# Patient Record
Sex: FEMALE | Race: WHITE | URBAN - METROPOLITAN AREA
[De-identification: names, ages, dates, MRNs, and addresses within clinical notes are randomized per-mention and may not be internally consistent; named-entity substitution may affect disease eponyms.]

---

## 2017-05-16 ENCOUNTER — PRE VISIT (OUTPATIENT)
Dept: CARDIOLOGY | Facility: CLINIC | Age: 32
End: 2017-05-16

## 2017-05-17 ENCOUNTER — OFFICE VISIT (OUTPATIENT)
Dept: CARDIOLOGY | Facility: CLINIC | Age: 32
End: 2017-05-17
Payer: COMMERCIAL

## 2017-05-17 VITALS — OXYGEN SATURATION: 98 % | SYSTOLIC BLOOD PRESSURE: 105 MMHG | DIASTOLIC BLOOD PRESSURE: 74 MMHG | HEART RATE: 76 BPM

## 2017-05-17 DIAGNOSIS — R00.2 PALPITATIONS: Primary | ICD-10-CM

## 2017-05-17 DIAGNOSIS — R06.09 DOE (DYSPNEA ON EXERTION): ICD-10-CM

## 2017-05-17 PROCEDURE — 93000 ELECTROCARDIOGRAM COMPLETE: CPT | Performed by: INTERNAL MEDICINE

## 2017-05-17 PROCEDURE — 99203 OFFICE O/P NEW LOW 30 MIN: CPT | Mod: 25 | Performed by: INTERNAL MEDICINE

## 2017-05-17 ASSESSMENT — PAIN SCALES - GENERAL: PAINLEVEL: MILD PAIN (2)

## 2017-05-17 NOTE — LETTER
5/17/2017      RE: Adela Sanchez  [address] [facility] [address]       Dear Colleague,    Thank you for the opportunity to participate in the care of your patient, Adela Sanchez, at the [facility] HEART AT [facility] Please see a copy of my visit note below.       SUBJECTIVE:  Adela Sanchez is a 32 year old female who presents for evaluation of palpitation and SOB.    Had second baby a week ago. During late pregnancy and post partum noticed palpitation and SOB. Get up in night with fast heartbeat,.Associated with SOB. No LOC   No excess coffee or alcohol.  Last pregnancy did not have this much problem.  Never had palpitation before.  No other co-morbidities.    Patient Active Problem List    Diagnosis Date Noted     GERD (gastroesophageal reflux disease) 08/09/2011     Priority: Medium     CARDIOVASCULAR SCREENING; LDL GOAL LESS THAN 160 10/31/2010     Priority: Medium     Adjustment disorder with anxiety 01/07/2008     Priority: Medium    .  Current Outpatient Prescriptions   Medication Sig     Sertraline HCl (ZOLOFT PO) Take 25 mg by mouth     albuterol 90 MCG/ACT inhaler Inhale 2 puffs into the lungs every 6 hours as needed for shortness of breath / dyspnea.     fluticasone (FLONASE) 50 MCG/ACT nasal spray 1-2 sprays by Both Nostrils route daily.     FISH OIL PO 1 TABLET DAILY     MULTIVITAMIN OR None Entered     No current facility-administered medications for this visit.      Past Medical History:   Diagnosis Date     Anxiety disorder      Asthma, intermittent      Past Surgical History:   Procedure Laterality Date     C RAD RESEC TONSIL/PILLARS       Allergies   Allergen Reactions     Penicillins Swelling and Difficulty breathing     Social History     Social History     Marital status:      Spouse name: N/A     Number of children: N/A     Years of education: N/A     Occupational History      Not on file.     Social History Main Topics     Smoking status: Never Smoker     Smokeless tobacco: Never Used     Alcohol use Yes      Comment: wine occ.     Drug use: No     Sexual activity: Yes     Partners: Male     Birth control/ protection: Pill      Comment: Adriane     Other Topics Concern     Parent/Sibling W/ Cabg, Mi Or Angioplasty Before 65f 55m? No     Social History Narrative    Dairy/d 0-1 servings/d.     Caffeine 0 servings/d    Exercise 6 x week    Sunscreen used - Yes    Seatbelts used - Yes    Working smoke/CO detectors in the home - Yes    Guns stored in the home - No    Self Breast Exams - Yes    Self Testicular Exam - NOT APPLICABLE    Eye Exam up to date - No    Dental Exam up to date - No    Pap Smear up to date - Yes 4 months ago    Mammogram up to date - NOT APPLICABLE    PSA up to date - NOT APPLICABLE    Dexa Scan up to date - NOT APPLICABLE    Flex Sig / Colonoscopy up to date - NOT APPLICABLE    Immunizations up to date - Yes    Abuse: Current or Past(Physical, Sexual or Emotional)- No    Do you feel safe in your environment - Yes    1/08     Family History   Problem Relation Age of Onset     Prostate Cancer Father      Hypertension Paternal Grandfather      CANCER Paternal Grandmother      skin CA          REVIEW OF SYSTEMS:  General: negative, fever, chills, night sweats  Skin: negative, acne, rash and scaling  Eyes: negative, double vision, eye pain and photophobia  Ears/Nose/Throat: negative, nasal congestion and purulent rhinorrhea  Respiratory: No cough, No hemoptysis and negative  Cardiovascular: negative, chest pain, exertional chest pain or pressure, paroxysmal nocturnal dyspnea and orthopnea  Gastrointestinal: negative, dysphagia, nausea and vomiting  Genitourinary: negative, nocturia, dysuria and frequency  Musculoskeletal: negative, fracture, back pain, neck pain, arthritis and joint pain  Neurologic: negative, headaches, syncope, stroke, seizures and paralysis  Psychiatric:  negative, nervous breakdown, thoughts of self-harm and thoughts of hurting someone else  Hematologic/Lymphatic/Immunologic: negative, bleeding disorder, chills and fever  Endocrine: negative, cold intolerance, heat intolerance and hot flashes       OBJECTIVE:  Blood pressure 105/74, pulse 76, SpO2 98 %.  General Appearance: healthy, alert, active and no distress  Head: Normocephalic. No masses, lesions, tenderness or abnormalities  Eyes: conjuctiva clear, PERRL, EOM intact  Ears: External ears normal. Canals clear. TM's normal.  Nose: Nares normal  Mouth: normal  Neck: Supple, no cervical adenopathy, no thyromegaly  Lungs: clear to auscultation  Cardiac: regular rate and rhythm, normal S1 and S2, no murmur  Abdomen: Soft, nontender.  Normal bowel sounds.  No hepatosplenomegaly or abnormal masses  Extremities: no peripheral edema, peripheral pulses normal  Musculoskeletal: negative  Neurological: Cranial nerves 2-12 intact, motor strength intact       ASSESSMENT/PLAN:  32 yr old female. A week or so post delivery of second babby. Presents with palpitation and SOB,FLOWERS.  Never had similar symptoms in the past.  Patient do have anxiety,but this is different as per patient.  EKG done reviewed. SR.Low voltage.  Concern of postpartum cardiomyopathy.  Will check a week of Zio and an echo.  Will decide management once result available.  Meanwhile advised to avoid excess coffee/alcohol.  Per orders.   Return to Clinic PRN.    Please do not hesitate to contact me if you have any questions/concerns.     Sincerely,     CASTRO Noonan MD

## 2017-05-17 NOTE — MR AVS SNAPSHOT
After Visit Summary   5/17/2017    Adela Sanchez    MRN: [id number]           Patient Information     Date Of Birth          1985        Visit Information        Provider Department      5/17/2017 1:30 PM CASTRO Noonan MD [facility] PHYSICIANS HEART AT [facility]        Today's Diagnoses     Palpitations    -  1      Care Instructions    1.  Dr. SUDHIR Muller has ordered an echocardiogram to be performed.  We have scheduled your echocardiogram appointment at the  [facility] Imaging Department ([address] [place] [address]) for Monday, May 22, 2017 at 10:00am.  Please arrive 15 minutes early to allow time for registration.  The Cardiology Nurse will contact you regarding the results (please see result notification details at bottom of summary).    Echocardiogram Instructions:  -Wear comfortable clothing  -Refrain from wearing perfumes or scented lotions    2. Immediately following your echo, we will place a Zio Patch monitor on you to wear for 7 days.    3. Your follow up will be determined on your results.      [facility] Cardiology - [place] Location    If you have any questions regarding to your visit please contact your care team:     Cardiology  Telephone Number   Tamera Mcpherson  Cardiology RN's.    Lani Banegas CMA [phone]    After hours: [phone].  [phone]   For scheduling appts:     [phone] or  [phone]    After hours: [phone]   For the Device Clinic (Pacemakers and ICD's)  RN's :  Sanjuana Rome   During business hours: [phone]  After business hours:  [phone]- select option 4.      If you need a medication refill please contact your pharmacy.  Please allow 3 business days for your refill to be completed.    As always, Thank you for trusting us with your health care needs!  _____________________________________________________________________              Follow-ups after your visit         Your next 10 appointments already scheduled     May 22, 2017 10:00 AM CDT   Ech Complete with [id number]   [facility] [place] ([facility])    [address] [place] [id number] [phone]           1.  Please bring or wear a comfortable two-piece outfit. 2.  You may eat, drink and take your normal medicines. 3.  For any questions that cannot be answered, please contact the ordering physician            May 22, 2017 11:00 AM CDT   Nurse Only with FK ANCILLARY CARDS   [facility] HEART AT [facility] ([facility])    [address] [place] [id number]   [phone]              Future tests that were ordered for you today     Open Future Orders        Priority Expected Expires Ordered    Echocardiogram Complete Routine  5/17/2018 5/17/2017    Ziopatch Holter Monitor - Adult Routine  7/16/2017 5/17/2017            Who to contact     If you have questions or need follow up information about today's clinic visit or your schedule please contact [facility] directly at [phone].  Normal or non-critical lab and imaging results will be communicated to you by Automated Insightshart, letter or phone within 4 business days after the clinic has received the results. If you do not hear from us within 7 days, please contact the clinic through Mobilitrixt or phone. If you have a critical or abnormal lab result, we will notify you by phone as soon as possible.  Submit refill requests through TRAFFIQ or call your pharmacy and they will forward the refill request to us. Please allow 3 business days for your refill to be completed.          Additional Information About Your Visit        Automated Insightshart Information     TRAFFIQ gives you secure access to your electronic health record. If you see a primary care provider, you can also send messages to your care team and make appointments. If you have  questions, please call your primary care clinic.  If you do not have a primary care provider, please call [phone] and they will assist you.        Care EveryWhere ID     This is your Care EveryWhere ID. This could be used by other organizations to access your [facility] medical records  [id number]        Your Vitals Were     Pulse Pulse Oximetry                76 98%           Blood Pressure from Last 3 Encounters:   05/17/17 105/74   02/02/12 105/62   01/11/12 113/76    Weight from Last 3 Encounters:   02/02/12 62.1 kg (137 lb)   01/11/12 61.7 kg (136 lb)   10/21/11 61.3 kg (135 lb 4 oz)              We Performed the Following     EKG 12-lead complete w/read - Clinics        Primary Care Provider Office Phone #    Bob Rhoades [facility] [phone]       No address on file        Thank you!     Thank you for choosing [facility] PHYSICIANS HEART AT [facility]  for your care. Our goal is always to provide you with excellent care. Hearing back from our patients is one way we can continue to improve our services. Please take a few minutes to complete the written survey that you may receive in the mail after your visit with us. Thank you!             Your Updated Medication List - Protect others around you: Learn how to safely use, store and throw away your medicines at www.disposemymeds.org.          This list is accurate as of: 5/17/17  2:17 PM.  Always use your most recent med list.                   Brand Name Dispense Instructions for use    albuterol 90 MCG/ACT inhaler     1 Inhaler    Inhale 2 puffs into the lungs every 6 hours as needed for shortness of breath / dyspnea.       FISH OIL PO      1 TABLET DAILY       fluticasone 50 MCG/ACT spray    FLONASE    1 Package    1-2 sprays by Both Nostrils route daily.       MULTIVITAMIN PO      None Entered       ZOLOFT PO      Take 25 mg by mouth

## 2017-05-17 NOTE — PATIENT INSTRUCTIONS
1.  Dr. SUDHIR Muller has ordered an echocardiogram to be performed.  We have scheduled your echocardiogram appointment at the  [facility] Imaging Department ([address] [place] [address]) for Monday, May 22, 2017 at 10:00am.  Please arrive 15 minutes early to allow time for registration.  The Cardiology Nurse will contact you regarding the results (please see result notification details at bottom of summary).    Echocardiogram Instructions:  -Wear comfortable clothing  -Refrain from wearing perfumes or scented lotions    2. Immediately following your echo, we will place a Zio Patch monitor on you to wear for 7 days.    3. Your follow up will be determined on your results.      [facility] Cardiology [facility] Location    If you have any questions regarding to your visit please contact your care team:     Cardiology  Telephone Number   Tamera Mcpherson  Cardiology RN's.    Lani Banegas CMA [phone]    After hours: [phone].  [phone]   For scheduling appts:     [phone] or  [phone]    After hours: [phone]   For the Device Clinic (Pacemakers and ICD's)  RN's :  Sanjuana Rome   During business hours: [phone]  After business hours:  [phone]- select option 4.      If you need a medication refill please contact your pharmacy.  Please allow 3 business days for your refill to be completed.    As always, Thank you for trusting us with your health care needs!  _____________________________________________________________________

## 2017-05-17 NOTE — PROGRESS NOTES
SUBJECTIVE:  Adela Sanchez is a 32 year old female who presents for evaluation of palpitation and SOB.    Had second baby a week ago. During late pregnancy and post partum noticed palpitation and SOB. Get up in night with fast heartbeat,.Associated with SOB. No LOC   No excess coffee or alcohol.  Last pregnancy did not have this much problem.  Never had palpitation before.  No other co-morbidities.    Patient Active Problem List    Diagnosis Date Noted     GERD (gastroesophageal reflux disease) 08/09/2011     Priority: Medium     CARDIOVASCULAR SCREENING; LDL GOAL LESS THAN 160 10/31/2010     Priority: Medium     Adjustment disorder with anxiety 01/07/2008     Priority: Medium    .  Current Outpatient Prescriptions   Medication Sig     Sertraline HCl (ZOLOFT PO) Take 25 mg by mouth     albuterol 90 MCG/ACT inhaler Inhale 2 puffs into the lungs every 6 hours as needed for shortness of breath / dyspnea.     fluticasone (FLONASE) 50 MCG/ACT nasal spray 1-2 sprays by Both Nostrils route daily.     FISH OIL PO 1 TABLET DAILY     MULTIVITAMIN OR None Entered     No current facility-administered medications for this visit.      Past Medical History:   Diagnosis Date     Anxiety disorder      Asthma, intermittent      Past Surgical History:   Procedure Laterality Date     C RAD RESEC TONSIL/PILLARS       Allergies   Allergen Reactions     Penicillins Swelling and Difficulty breathing     Social History     Social History     Marital status:      Spouse name: N/A     Number of children: N/A     Years of education: N/A     Occupational History     Not on file.     Social History Main Topics     Smoking status: Never Smoker     Smokeless tobacco: Never Used     Alcohol use Yes      Comment: wine occ.     Drug use: No     Sexual activity: Yes     Partners: Male     Birth control/ protection: Pill      Comment: Adriane     Other Topics Concern     Parent/Sibling W/ Cabg, Mi Or Angioplasty Before 65f 55m? No      Social History Narrative    Dairy/d 0-1 servings/d.     Caffeine 0 servings/d    Exercise 6 x week    Sunscreen used - Yes    Seatbelts used - Yes    Working smoke/CO detectors in the home - Yes    Guns stored in the home - No    Self Breast Exams - Yes    Self Testicular Exam - NOT APPLICABLE    Eye Exam up to date - No    Dental Exam up to date - No    Pap Smear up to date - Yes 4 months ago    Mammogram up to date - NOT APPLICABLE    PSA up to date - NOT APPLICABLE    Dexa Scan up to date - NOT APPLICABLE    Flex Sig / Colonoscopy up to date - NOT APPLICABLE    Immunizations up to date - Yes    Abuse: Current or Past(Physical, Sexual or Emotional)- No    Do you feel safe in your environment - Yes    1/08     Family History   Problem Relation Age of Onset     Prostate Cancer Father      Hypertension Paternal Grandfather      CANCER Paternal Grandmother      skin [place]          REVIEW OF SYSTEMS:  General: negative, fever, chills, night sweats  Skin: negative, acne, rash and scaling  Eyes: negative, double vision, eye pain and photophobia  Ears/Nose/Throat: negative, nasal congestion and purulent rhinorrhea  Respiratory: No cough, No hemoptysis and negative  Cardiovascular: negative, chest pain, exertional chest pain or pressure, paroxysmal nocturnal dyspnea and orthopnea  Gastrointestinal: negative, dysphagia, nausea and vomiting  Genitourinary: negative, nocturia, dysuria and frequency  Musculoskeletal: negative, fracture, back pain, neck pain, arthritis and joint pain  Neurologic: negative, headaches, syncope, stroke, seizures and paralysis  Psychiatric: negative, nervous breakdown, thoughts of self-harm and thoughts of hurting someone else  Hematologic/Lymphatic/Immunologic: negative, bleeding disorder, chills and fever  Endocrine: negative, cold intolerance, heat intolerance and hot flashes       OBJECTIVE:  Blood pressure 105/74, pulse 76, SpO2 98 %.  General Appearance: healthy, alert, active and no  distress  Head: Normocephalic. No masses, lesions, tenderness or abnormalities  Eyes: conjuctiva clear, PERRL, EOM intact  Ears: External ears normal. Canals clear. TM's normal.  Nose: Nares normal  Mouth: normal  Neck: Supple, no cervical adenopathy, no thyromegaly  Lungs: clear to auscultation  Cardiac: regular rate and rhythm, normal S1 and S2, no murmur  Abdomen: Soft, nontender.  Normal bowel sounds.  No hepatosplenomegaly or abnormal masses  Extremities: no peripheral edema, peripheral pulses normal  Musculoskeletal: negative  Neurological: Cranial nerves 2-12 intact, motor strength intact       ASSESSMENT/PLAN:  32 yr old female. A week or so post delivery of second babby. Presents with palpitation and SOB,FLOWERS.  Never had similar symptoms in the past.  Patient do have anxiety,but this is different as per patient.  EKG done reviewed. SR.Low voltage.  Concern of postpartum cardiomyopathy.  Will check a week of Zio and an echo.  Will decide management once result available.  Meanwhile advised to avoid excess coffee/alcohol.  Per orders.   Return to Clinic PRN.

## 2017-05-17 NOTE — NURSING NOTE
Cardiac Testing: Patient given instructions regarding  echocardiogram . Discussed purpose, preparation, procedure and when to expect results reported back to the patient. Patient demonstrated understanding of this information and agreed to call with further questions or concerns.    7 day zio.    Med Reconcile: Reviewed and verified all current medications with the patient. The updated medication list was printed and given to the patient.    Return Appointment: Patient given instructions regarding scheduling next clinic visit. Patient demonstrated understanding of this information and agreed to call with further questions or concerns.    Patient stated she understood all health information given and agreed to call with further questions or concerns.    Tamera Elena RN

## 2017-05-17 NOTE — NURSING NOTE
"Chief Complaint   Patient presents with     Palpitations     Referred by her OB Dr. De La Garza. Pt does have abnormal fatigue, sob, occ dizziness. Has noticed sx more in the last 2 weeks. States she did have a baby 6 weeks ago and had a racing heart  in 3rd trimester.  Did  have low iron levels are better now.       Initial /74 (BP Location: Left arm, Patient Position: Chair, Cuff Size: Adult Regular)  Pulse 76  SpO2 98% Estimated body mass index is 23.52 kg/(m^2) as calculated from the following:    Height as of 2/2/12: 1.626 m (5' 4\").    Weight as of 2/2/12: 62.1 kg (137 lb)..  BP completed using cuff size: regular    Chana Banegas CMA    "

## 2017-05-22 ENCOUNTER — ALLIED HEALTH/NURSE VISIT (OUTPATIENT)
Dept: CARDIOLOGY | Facility: CLINIC | Age: 32
End: 2017-05-22
Payer: COMMERCIAL

## 2017-05-22 ENCOUNTER — RADIANT APPOINTMENT (OUTPATIENT)
Dept: CARDIOLOGY | Facility: CLINIC | Age: 32
End: 2017-05-22
Attending: INTERNAL MEDICINE
Payer: COMMERCIAL

## 2017-05-22 DIAGNOSIS — R00.2 PALPITATIONS: ICD-10-CM

## 2017-05-22 PROCEDURE — 93306 TTE W/DOPPLER COMPLETE: CPT | Performed by: INTERNAL MEDICINE

## 2017-05-22 PROCEDURE — 0298T ZZC EXT ECG > 48HR TO 21 DAY REVIEW AND INTERPRETATN: CPT | Performed by: INTERNAL MEDICINE

## 2017-05-22 PROCEDURE — 0296T ZIO PATCH HOLTER: CPT | Performed by: INTERNAL MEDICINE

## 2017-05-22 NOTE — MR AVS SNAPSHOT
After Visit Summary   5/22/2017    Adela Sanchez    MRN: [id number]           Patient Information     Date Of Birth          1985        Visit Information        Provider Department      5/22/2017 11:00 AM FK ANCILLARY CARDS [facility] PHYSICIANS HEART AT [facility]        Today's Diagnoses     Palpitations           Follow-ups after your visit        Who to contact     If you have questions or need follow up information about today's clinic visit or your schedule please contact [facility] PHYSICIANS HEART AT [facility] directly at [phone].  Normal or non-critical lab and imaging results will be communicated to you by PaperVhart, letter or phone within 4 business days after the clinic has received the results. If you do not hear from us within 7 days, please contact the clinic through TheraVidt or phone. If you have a critical or abnormal lab result, we will notify you by phone as soon as possible.  Submit refill requests through London Television or call your pharmacy and they will forward the refill request to us. Please allow 3 business days for your refill to be completed.          Additional Information About Your Visit        MyChart Information     London Television gives you secure access to your electronic health record. If you see a primary care provider, you can also send messages to your care team and make appointments. If you have questions, please call your primary care clinic.  If you do not have a primary care provider, please call [phone] and they will assist you.        Care EveryWhere ID     This is your Care EveryWhere ID. This could be used by other organizations to access your [facility] medical records  [id number]         Blood Pressure from Last 3 Encounters:   05/17/17 105/74   02/02/12 105/62   01/11/12 113/76    Weight from Last 3 Encounters:   02/02/12 62.1 kg (137 lb)   01/11/12 61.7 kg (136 lb)   10/21/11 61.3 kg (135 lb 4 oz)               We Performed the Following     Ziopatch Holter Monitor - Adult        Primary Care Provider Office Phone #    Bob Rhoades [facility] [phone]       No address on file        Thank you!     Thank you for choosing [facility] PHYSICIANS HEART AT [facility]  for your care. Our goal is always to provide you with excellent care. Hearing back from our patients is one way we can continue to improve our services. Please take a few minutes to complete the written survey that you may receive in the mail after your visit with us. Thank you!             Your Updated Medication List - Protect others around you: Learn how to safely use, store and throw away your medicines at www.disposemymeds.org.          This list is accurate as of: 5/22/17 12:15 PM.  Always use your most recent med list.                   Brand Name Dispense Instructions for use    albuterol 90 MCG/ACT inhaler     1 Inhaler    Inhale 2 puffs into the lungs every 6 hours as needed for shortness of breath / dyspnea.       FISH OIL PO      1 TABLET DAILY       fluticasone 50 MCG/ACT spray    FLONASE    1 Package    1-2 sprays by Both Nostrils route daily.       MULTIVITAMIN PO      None Entered       ZOLOFT PO      Take 25 mg by mouth

## 2017-05-22 NOTE — NURSING NOTE
1 week ZioXT applied to patient today. Instructions on use and removal given and mail back instructions discussed. All questions answered. Consent form signed. Device registered. Form faxed to [employer].  Device number: [id number].    Chana Banegas CMA.

## 2017-06-13 ENCOUNTER — MYC MEDICAL ADVICE (OUTPATIENT)
Dept: CARDIOLOGY | Facility: CLINIC | Age: 32
End: 2017-06-13

## 2017-06-13 NOTE — TELEPHONE ENCOUNTER
"Spoke to patient who reports that she does feel a \"flutter\" sensation almost daily.  Patient states she is currently breastfeeding.  Results discussed in detail and the zio was essentially normal with some ectopy found.  Patient is symptomatic and wants to know if there are any medication that she can take that would be safe while breastfeeding.    Will review with Dr. Muller in clinic tomorrow.    Gely Wen RN    "

## 2017-06-14 NOTE — TELEPHONE ENCOUNTER
Per Dr. Muller, patient can take metoprolol 25 mg daily for her PVC's.  It was decided that if the patient can tolerate her symptoms and once she is done breastfeeding she can then start the metoprolol.  Patient is agreeable to the plan.  Also complaining of fatigue and continued anxiety.  Advised patient to go to her primary provider to have a work up and have her thyroid check.  Patient verbalized understanding.    Gely Wen RN  Care Coordinator  [facility]  [phone]

## 2017-07-10 ENCOUNTER — TELEPHONE (OUTPATIENT)
Dept: CARDIOLOGY | Facility: CLINIC | Age: 32
End: 2017-07-10

## 2017-07-10 NOTE — TELEPHONE ENCOUNTER
"Patient called reporting that she feels her palpitations have worsened.  Yesterday she was exercising and she felt her heart beating irregular and \"it took my breath away\" and that it felt like her heart was being \"squeezed\".  Symptoms resolved with rest.  Also states that she is having continued lightheadedness where she feels like she is going to faint but has not fainted.      Recent echo and zio were essentially normal.  zio showing PVC burden of 2.1%.  Dr. Muller recommended metoprolol.  Pt is currently breast feeding and is not taking metoprolol at this time.  Patient reports that she does have a history of anxiety but states \"this is different\" and does not feel her symptoms are anxiety driven.  States she has been staying well-hydrated.  She does not believe she has family history of early onset CAD in her family but her dad was adopted.  Her Grandma was just diagnosed with what she believes is atrial fibrillation.    Writer advised for patient to see her primary care provider as soon as possible to get her thyroid checked out and a complete work up per Dr. Muller.  If symptoms worsen in the mean time patient was advised to go to the ED or Urgent Care to be evaluated.  Patient verbalized understanding.    Gely Wen RN     "

## 2017-09-10 ENCOUNTER — HEALTH MAINTENANCE LETTER (OUTPATIENT)
Age: 32
End: 2017-09-10

## 2017-11-03 ENCOUNTER — TELEPHONE (OUTPATIENT)
Dept: OTHER | Facility: CLINIC | Age: 32
End: 2017-11-03

## 2017-11-06 ENCOUNTER — VIRTUAL VISIT (OUTPATIENT)
Dept: FAMILY MEDICINE | Facility: OTHER | Age: 32
End: 2017-11-06

## 2017-11-08 NOTE — PROGRESS NOTES
"Date:   Clinician: Pebbles Cohen  Clinician NPI: [id number]  Patient: Adela Frias  Patient : 1985  Patient Address: [address] [place] [id number]  Patient Phone: [phone]  Visit Protocol: URI  Patient Summary:  Adela is a 32 year old ( : 1985 ) female who initiated a Visit for cold, sinus infection, or influenza. When asked the question \"Please sign me up to receive news, health information and promotions. \", Adela responded \"No\".    Adela states her symptoms started gradually 2-3 weeks ago.   Her symptoms consist of a sore throat, nasal congestion, malaise, a cough, a headache, tooth pain, and facial pain or pressure.   Symptom Details     Nasal secretions: The color of her mucus is green, yellow, and white.    Cough: Adela coughs a few times an hour and her cough is not more bothersome at night. Phlegm does not come into her throat when she coughs.     Sore throat: Adela reports having mild throat pain, does not have exudate on her tonsils, and is able to swallow liquids. The lymph nodes in her neck are not enlarged. She states that rashes have not appeared on the skin since the sore throat started.     Facial pain or pressure: The facial pain or pressure feels worse when bending over or leaning forward.     Headache: She states the headache is moderate.     Tooth pain: The tooth pain is not caused by a cavity, recent dental work, or other mouth problems.      Adela denies having enlarged lymph nodes, wheezing, myalgias, chills, rhinitis, dyspnea, ear pain, and fever. She also denies double sickening, taking antibiotic medication for the symptoms, and having recent facial or sinus surgery in the past 60 days.   Within the past week, Adela has not been exposed to someone with strep throat. She has not recently been exposed to someone with influenza. Adela has been in close contact with the following high risk individuals: children under the " age of 5.   Weight: 142 lbs   Adela does not smoke or use smokeless tobacco.   She denies pregnancy and denies breastfeeding. She does not menstruate.   Additional information as reported by the patient (free text): I know I have a sinus infection as I get them all the time. Would prefer either doxycycline or zpack   MEDICATIONS:  No current medications , ALLERGIES:   cefuroxime/ceftriaxone/keflex and penicillin/amoxicillin/augmentin    Clinician Response:  Dear Adela,  Based on the information you have provided, you likely have  acute bacterial sinusitis, otherwise known as a sinus infection.  I am prescribing fluticasone propionate nasal (Flonase) 50 mcg/spray. Take one or two inhalations in each nostril one time a day. There are no refills with this prescription.   I am prescribing Doxycycline Hyclate [Vibramycin] 100 mg. Take one tablet by mouth two times a day for 7 days. There are no refills with this prescription.   Unless your are allergic to the over-the-counter medication(s) below, I recommend using:   Saline nasal spray (such as Ocean or store brand). Use 1-2 sprays in each nostril 3 times a day as needed for congestion. This is an over-the-counter medication that does not require a prescription.   Guaifenesin + dextromethorphan (Robitussin DM, Mucinex DM). This is an over-the-counter medication that does not require a prescription.   Acetaminophen (Tylenol), which helps to reduce your discomfort and fever. Take 1-2 pills every 4-6 hours. This is an over-the-counter medication that does not require a prescription.   Ibuprofen. Take 1-3 tablets (200-600mg) every 8 hours to help with the discomfort. Make sure to take the ibuprofen with food. Do not exceed 2400mg in 24 hours. This is an over-the-counter medication that does not require a prescription.   Some people develop allergies to antibiotics. If you have significant swelling or difficulty breathing, stop the medication immediately and call 911 or  go to an emergency room. If you notice a new rash, be seen at a clinic or urgent care.  Some women may also develop a yeast infection as a side effect of taking antibiotics. If you notice symptoms of a yeast infection, please use [facility] to get treatment.  If you become pregnant during this course of treatment, stop taking the medication and contact your primary care provider.  You will feel better faster if you take care of yourself by getting more rest and drinking plenty of liquids, especially water.  Remember to wash your hands often and stay home while you are sick to decrease the chance you will spread your infection to others.  Try the following to help with your throat pain and discomfort:     Use throat lozenges    Gargle with warm salt water (1/4 teaspoon of salt per 8 ounce glass of water)    Suck on frozen items such as popsicles or ice cubes     Call 911 or go to the emergency room if you feel that your throat is closing off, you suddenly develop a rash, you are unable to swallow fluids, you are drooling, or you are having difficulty breathing.   Diagnosis: Acute bacterial sinusitis  Diagnosis ICD: J01.90  Prescription: doxycycline Hyclate (Vibramycin) 100mg oral tablet 14 tablets, 7 days supply. Take one tablet by mouth two times a day for 7 days. Refills: 0, Refill as needed: no, Allow substitutions: yes  Prescription: fluticasone propionate (Flonase) 50mcg nasal spray 16 gm, 30 days supply. Take one or two inhalations in each nostril one time a day. Refills: 0, Refill as needed: no, Allow substitutions: yes  Pharmacy: [facility] [id number] [facility] - [phone] - [address] [place] [address]

## 2018-03-30 ENCOUNTER — VIRTUAL VISIT (OUTPATIENT)
Dept: FAMILY MEDICINE | Facility: OTHER | Age: 33
End: 2018-03-30

## 2018-03-31 NOTE — PROGRESS NOTES
"Date:   Clinician: Girma Magaña  Clinician NPI: [id number]  Patient: Adela Frias  Patient : 1985  Patient Address: [address] [place] [address]  Patient Phone: [phone]  Visit Protocol: URI  Patient Summary:  Adela is a 33 year old ( : 1985 ) female who initiated a Visit for cold, sinus infection, or influenza. When asked the question \"Please sign me up to receive news, health information and promotions. \", Adela responded \"No\".    Adela states her symptoms started gradually 10-13 days ago.   Her symptoms consist of facial pain or pressure, a cough, ear pain, a headache, nasal congestion, and malaise.   Symptom details     Nasal secretions: The color of her mucus is yellow and green.    Cough: Adela coughs a few times an hour and her cough is not more bothersome at night. Phlegm comes into her throat when she coughs. She believes the phlegm causes the cough. The color of the phlegm is green.     Facial pain or pressure: The facial pain or pressure feels worse when bending over or leaning forward.     Headache: She states the headache is moderate (between 4-6 on a 10 point pain scale).      Adela denies having fever, dyspnea, sore throat, chills, wheezing, myalgias, rhinitis, and teeth pain. She also denies having a sinus infection within the past year, having recent facial or sinus surgery in the past 60 days, taking antibiotic medication for the symptoms, and double sickening (worsening symptoms after initial improvement).   She has not recently been exposed to someone with influenza. Adela has been in close contact with the following high risk individuals: children under the age of 5.   Weight: 150 lbs   Adela does not smoke or use smokeless tobacco.   She denies pregnancy and denies breastfeeding. She has menstruated in the past month.   Additional information as reported by the patient (free text): I'm pretty sure I have a sinus infection. " I've used the z-pack in the past for these.    MEDICATIONS:  No current medications , ALLERGIES:   penicillin/amoxicillin/augmentin    Clinician Response:  Dear Adela,  Based on the information provided, you have acute bacterial sinusitis, also known as a sinus infection. Sinus infections are caused by bacteria or a virus and symptoms are almost always identical. The difference between the 2 types of infections is timing.  Sinus infections start as viral infections and symptoms improve on their own in about 7 days. If symptoms have not improved after 7 days or have even worsened, a bacterial infection may have developed.  Medication information  I am prescribing:     Azithromycin (Z-pack) 250 mg oral tablet. Take 2 tablets by mouth on the first day and then 1 tablet a day on days 2-5. There are no refills with this prescription.   Antibiotics can cause an allergic reaction even if you have taken them without a problem in the past. If you develop a new rash, swelling, or difficulty breathing, stop the medication and be seen in a clinic or urgent care immediately.  A yeast infection is a side effect of taking antibiotics in some women. Please use [facility] to get treatment if you have symptoms of a yeast infection.  If you become pregnant during this course of treatment, stop taking the medication and contact your primary care provider.  Self care  The following tips will keep you as comfortable as possible while you recover:     Rest    Drink plenty of water and other liquids    Take a hot shower to loosen congestion    Take a spoonful of honey to reduce your cough     When to seek care  Please be seen in a clinic or urgent care if any of the following occur:     Symptoms do not start to improve after 3 days of treatment    New symptoms develop, or symptoms become worse      Diagnosis: Acute bacterial sinusitis  Diagnosis ICD: J01.90  Prescription: azithromycin (Z-pack) 250 mg oral tablet 6 tablets, 5 days supply.  Take 2 tablets by mouth on the first day and then 1 tablet a day on days 2-5. Refills: 0, Refill as needed: no, Allow substitutions: yes  Pharmacy: [facility]/pharmacy #[id number] - [phone] - [id number] [facility] [place], [place] [id number]

## 2018-10-24 ENCOUNTER — VIRTUAL VISIT (OUTPATIENT)
Dept: FAMILY MEDICINE | Facility: OTHER | Age: 33
End: 2018-10-24

## 2018-10-25 NOTE — PROGRESS NOTES
"Date:   Clinician: Sanjuana Salas  Clinician NPI: [id number]  Patient: Adela Frias  Patient : 1985  Patient Address: [address] [place] [address]  Patient Phone: [phone]  Visit Protocol: URI  Patient Summary:  Adela is a 33 year old ( : 1985 ) female who initiated a Visit for cold, sinus infection, or influenza. When asked the question \"Please sign me up to receive news, health information and promotions. \", Adela responded \"No\".    Adela states her symptoms started gradually 1 month or more ago.   Her symptoms consist of a headache, a cough, chills, malaise, facial pain or pressure, myalgia, and nasal congestion.   Symptom details     Nasal secretions: The color of her mucus is yellow and green.    Cough: Adela coughs a few times an hour and her cough is more bothersome at night. Phlegm comes into her throat when she coughs. She believes the phlegm causes the cough. The color of the phlegm is yellow and green.     Facial pain or pressure: She has not had the facial pain or pressure for 12 weeks or more. The facial pain or pressure feels worse when bending over or leaning forward.     Headache: Adela has not had the headache for 12 weeks or more. She states the headache is moderate (4-6 on a 10 point pain scale).      Adela denies having sore throat, ear pain, dyspnea, fever, wheezing, teeth pain, and rhinitis. She also denies double sickening (worsening symptoms after initial improvement), taking antibiotic medication for the symptoms, and having recent facial or sinus surgery in the past 60 days.   She has not recently been exposed to someone with influenza. Adela has been in close contact with the following high risk individuals: children under the age of 5.   Adela had 1 sinus infection within the past year.   Weight: 150 lbs   Adela does not smoke or use smokeless tobacco.   She denies pregnancy and denies breastfeeding. She " has menstruated in the past month.   Additional information as reported by the patient (free text): I always get a yeast infection after I take an antibiotic.   MEDICATIONS: Xanax oral, albuterol sulfate inhalation, Pulmicort inhalation, ALLERGIES: Penicillins  Clinician Response:  Dear Adela,  Based on the information provided, you have acute bacterial sinusitis, also known as a sinus infection. Sinus infections are caused by bacteria or a virus and symptoms are almost always identical. The difference between the 2 types of infections is timing.  Sinus infections start as viral infections and symptoms improve on their own in about 7 days. If symptoms have not improved after 7 days or have even worsened, a bacterial infection may have developed.  Medication information  I am prescribing:     Doxycycline hyclate 100 mg oral tablet. Take 1 tablet by mouth 2 times per day for 7 days. There are no refills with this prescription.   Antibiotics can cause an allergic reaction even if you have taken them without a problem in the past. If you develop a new rash, swelling, or difficulty breathing, stop the medication and be seen in a clinic or urgent care immediately.  A yeast infection is a side effect of taking antibiotics in some women. Please use [facility] to get treatment if you have symptoms of a yeast infection.  If you become pregnant during this course of treatment, stop taking the medication and contact your primary care provider.  Self care  The following tips will keep you as comfortable as possible while you recover:     Rest    Drink plenty of water and other liquids    Take a hot shower to loosen congestion    Take a spoonful of honey to reduce your cough     When to seek care  Please be seen in a clinic or urgent care if any of the following occur:     Symptoms do not start to improve after 3 days of treatment    New symptoms develop, or symptoms become worse      Diagnosis: Acute bacterial sinusitis  Diagnosis  ICD: J01.90  Prescription: doxycycline hyclate 100 mg oral tablet 14 tablet, 7 days supply. Take 1 tablet by mouth 2 times per day for 7 days. Refills: 0, Refill as needed: no, Allow substitutions: yes  Pharmacy: [facility] [id number] [facility] - [phone] - [address], [place] [id number]

## 2019-11-08 ENCOUNTER — HEALTH MAINTENANCE LETTER (OUTPATIENT)
Age: 34
End: 2019-11-08

## 2020-02-23 ENCOUNTER — HEALTH MAINTENANCE LETTER (OUTPATIENT)
Age: 35
End: 2020-02-23

## 2020-12-06 ENCOUNTER — HEALTH MAINTENANCE LETTER (OUTPATIENT)
Age: 35
End: 2020-12-06

## 2021-09-25 ENCOUNTER — HEALTH MAINTENANCE LETTER (OUTPATIENT)
Age: 36
End: 2021-09-25

## 2022-01-15 ENCOUNTER — HEALTH MAINTENANCE LETTER (OUTPATIENT)
Age: 37
End: 2022-01-15

## 2023-04-22 ENCOUNTER — HEALTH MAINTENANCE LETTER (OUTPATIENT)
Age: 38
End: 2023-04-22